# Patient Record
Sex: FEMALE | Race: WHITE
[De-identification: names, ages, dates, MRNs, and addresses within clinical notes are randomized per-mention and may not be internally consistent; named-entity substitution may affect disease eponyms.]

---

## 2017-03-29 ENCOUNTER — HOSPITAL ENCOUNTER (EMERGENCY)
Dept: HOSPITAL 80 - CED | Age: 72
Discharge: HOME | End: 2017-03-29
Payer: COMMERCIAL

## 2017-03-29 VITALS
HEART RATE: 76 BPM | TEMPERATURE: 98.1 F | DIASTOLIC BLOOD PRESSURE: 85 MMHG | OXYGEN SATURATION: 93 % | SYSTOLIC BLOOD PRESSURE: 131 MMHG

## 2017-03-29 VITALS — RESPIRATION RATE: 16 BRPM

## 2017-03-29 DIAGNOSIS — J40: Primary | ICD-10-CM

## 2017-03-29 PROCEDURE — 71020: CPT

## 2017-03-29 NOTE — UCPHY
H & P


Time Seen by Provider: 03/29/17 07:37


Patient Type: Established


HPI/ROS: 





71-year-old female presents complaining of cough for approximately 8 days, some 

mild nasal congestion, no sore throat no earache.


A fever at the beginning of a days however no longer having fevers or chills. 

She does complain that she has had a decreased appetite and is fatigued.





Review of systems


As per HPI


General no fever no chills no weakness, positive fatigue positive poor appetite


HEENT no eye pain no eye discharge. No eye redness, no sore throat


Respiratory positive cough, no shortness of breath


Cardiac no chest pain, no peripheral edema


GI no abdominal pain, no diarrhea, no constipation, no nausea, no vomiting


  no flank pain, no hematuria, no dysuria


Musculoskeletal no myalgias, no joint pain


Heme  no easy bruising, no easy bleeding


Endo no polyuria, no polydipsia


Skin no rashes, no pruritus


Neuro no syncope, no dizziness, no headaches


Psych is no suicidal ideation, no homicidal ideation





Past Medical/Surgical History: 





No history of asthma or emphysema.


Social History: 





Quit smoking 15-16 years ago, denies excessive alcohol or drug use.


Smoking Status: Former smoker


Physical Exam: 


71-year-old female alert and oriented


Alert and oriented nontoxic appearance, no acute distress afebrile


Atraumatic normocephalic


Extraocular muscles intact, anicteric


Nares mild yellowish discharge


Oropharynx mild erythema no tonsillar swelling no exudate no uvular deviation, 

tolerating own secretions


Neck supple no lymphadenopathy


Lungs clear to auscultation bilaterally


Heart regular rate and rhythm


Abdomen normoactive bowel sounds soft nontender


Extremities no cyanosis clubbing or edema


Skin no rash


Constitutional: 


 Initial Vital Signs











Temperature (C)  36.8 C   03/29/17 07:44


 


Heart Rate  83   03/29/17 07:44


 


Respiratory Rate  16   03/29/17 07:44


 


Blood Pressure  133/86 H  03/29/17 07:44


 


O2 Sat (%)  92   03/29/17 07:44








 











O2 Delivery Mode               Room Air














Allergies/Adverse Reactions: 


 





prednisone Allergy (Intermediate, Verified 03/29/17 07:47)


 Hives


acetaminophen Allergy (Unknown, Verified 03/29/17 07:47)


 LIGHT HEADEDNESS


hydrocodone bitartrate [From Lorcet 10/650] Allergy (Unknown, Verified 03/29/17 

07:47)


 LIGHT HEADEDNESS


pentazocine lactate [From Talwin] Allergy (Unknown, Verified 03/29/17 07:47)


 SHAKY


Sulfa (Sulfonamide Antibiotics) Allergy (Unknown, Verified 03/29/17 07:47)


 Rash


sulfamethoxazole [From Gantanol] Allergy (Unknown, Verified 03/29/17 07:47)


 Rash








Home Medications: 














 Medication  Instructions  Recorded


 


Aspirin EC [Aspirin EC 81 mg (OTC)]  06/24/13


 


FENOFIBRATE  06/24/13


 


Fiber [Fiber Diet]  06/24/13


 


Levothyroxine [Synthroid 75 mcg  06/24/13





(RX)]  


 


Omega-3 Fatty Acids [Fish Oil 1000  06/24/13





mg (OTC)]  


 


Labetalol HCl  03/29/17


 


Valsartan  03/29/17














Medical Decision Making


ED Course/Re-evaluation: 





Patient seen and evaluated for cough, cold symptoms of a days duration





Physical exam significant for coarse cough however clear lung fields





Differential diagnosis considered


URI, bronchitis, pharyngitis, pneumonia





Chest x-ray negative








Impression


Bronchitis


Likely viral





Plan


Symptomatic care


Follow up with primary care physician





Departure





- Departure


Disposition: Home, Routine, Self-Care


Clinical Impression: 


 Bronchitis





Condition: Good


Instructions:  Acute Bronchitis (ED)


Referrals: 


TERI,UNKNOWN [Other] - As per Instructions





- PQRS


PQRS Measurement: 





na

## 2017-05-16 NOTE — GHP
[f rep st]



                                                       PREOP HISTORY AND PHYSICAL





DATE OF ADMISSION:  05/31/2017



PROBLEM:  Left hip arthritis.



HISTORY OF PRESENT ILLNESS:  The patient is a 71-year-old woman admitted for a left total hip arthro
plasty.  In the last few months, she has had progressive pain in her left hip.  She is limping.  She
 asked to take a shorter stride.  I did her right total hip arthroplasty in 2013, and she has had an
 excellent result.  She has had 5 operations on her lumbar spine and is currently fused from T8 thro
ugh the sacrum.  She is now having sustained significant left hip pain which is interfering with her
 activities.  She cannot take antiinflammatory medications because she has a damaged kidney.  She is
 admitted for a left total hip arthroplasty.



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
nager.  She was treated for a melanoma in 1988.  She has developed anti-retinal antibodies, which ha
s affected her vision.  She is also treated for hypertension and hypothyroidism.  She has had a spin
al fusion from T8 through the sacrum.  She also has peripheral neuropathy.  There is no history of h
eart disease, stents, DVT, hepatitis, or sleep apnea.  She has chronic lower extremity edema.  The l
eft side is worse than the right.



CURRENT MEDICATIONS:  Amlodipine 10 mg per day.  Atenolol 50 mg per day.  Levothyroxine 75 mcg per d
ay.  Valsartan 80 mg per day.  She also uses hormonal vaginal cream.



DRUG ALLERGIES:  Talwin.  Lorcet causes lightheadedness.  Prednisone causes a rash.



METAL ALLERGIES:  None.



LATEX ALLERGY:  None.



SOCIAL HISTORY:  The patient does not smoke cigarettes and occasionally drinks alcohol.  She is reti
red.  She is  and lives with her .



PHYSICAL EXAMINATION:  GENERAL:  She is a healthy-appearing woman.  Height 5 feet 5 inches.  Weight 
170 pounds.  BMI 28.3.  EYES:  She has had cataract surgery in her left eye with a lens implant.  He
r vision in the left eye is severely reduced.  MOUTH:  Good oral hygiene.  No loose teeth.  CHEST:  
Clear.  HEART:  Regular rhythm.  No murmurs.  EXTREMITIES:  Pertinent findings limited to her left h
ip.  She has full hip extension and 110 degrees of flexion.  External rotation 20 degrees.  Internal
 rotation 0 degrees.  Abduction 30 degrees.



IMAGING:  Her films show advanced degenerative arthritis of the left hip with cartilage space narrow
ing.  Her right total hip looks excellent.  She has hardware in the lower lumbar spine and extending
 into the sacrum and pelvis.



IMPRESSION ON ADMISSION:  

1.  Left hip degenerative arthritis.  She is prepared for a left total hip arthroplasty.

2.  Four years status post successful right total hip arthroplasty.

3.  Spinal fusion from T8 to the sacrum.

4.  Treatment for hypertension and hypothyroidism.

5.  History of chronic lower extremity edema. 





She will undergo a left total hip arthroplasty.  The surgery has been described to her, including th
e risks, complications, expectations, and recovery time.  I have talked to her about the risk of dis
location, leg length inequality, infection, and sciatic nerve injury.  Cup positioning is more probl
ematic because of her lumbosacral fusion.  I have advised her that with bilateral procedures there c
an be mild side-to-side differences in the recovery and in the final result.  All her questions have
 been answered, and she consents to surgery.





Job #:  [id number]

## 2017-05-31 ENCOUNTER — HOSPITAL ENCOUNTER (INPATIENT)
Dept: HOSPITAL 80 - F3N | Age: 72
LOS: 1 days | Discharge: HOME | DRG: 470 | End: 2017-06-01
Attending: ORTHOPAEDIC SURGERY | Admitting: ORTHOPAEDIC SURGERY
Payer: COMMERCIAL

## 2017-05-31 DIAGNOSIS — Z98.1: ICD-10-CM

## 2017-05-31 DIAGNOSIS — I10: ICD-10-CM

## 2017-05-31 DIAGNOSIS — M16.12: Primary | ICD-10-CM

## 2017-05-31 DIAGNOSIS — Z96.641: ICD-10-CM

## 2017-05-31 DIAGNOSIS — E03.9: ICD-10-CM

## 2017-05-31 PROCEDURE — 0SRB04Z REPLACEMENT OF LEFT HIP JOINT WITH CERAMIC ON POLYETHYLENE SYNTHETIC SUBSTITUTE, OPEN APPROACH: ICD-10-PCS | Performed by: ORTHOPAEDIC SURGERY

## 2017-05-31 RX ADMIN — Medication SCH MLS: at 13:35

## 2017-05-31 RX ADMIN — Medication SCH MLS: at 22:20

## 2017-05-31 RX ADMIN — SODIUM CHLORIDE, SODIUM LACTATE, POTASSIUM CHLORIDE, AND CALCIUM CHLORIDE SCH MLS: 600; 310; 30; 20 INJECTION, SOLUTION INTRAVENOUS at 22:18

## 2017-05-31 RX ADMIN — OXYCODONE HYDROCHLORIDE PRN MG: 15 TABLET ORAL at 16:48

## 2017-05-31 RX ADMIN — DOCUSATE SODIUM AND SENNOSIDES SCH TAB: 50; 8.6 TABLET ORAL at 21:06

## 2017-05-31 RX ADMIN — OXYCODONE HYDROCHLORIDE PRN MG: 15 TABLET ORAL at 22:49

## 2017-05-31 RX ADMIN — TRANEXAMIC ACID SCH MG: 650 TABLET ORAL at 13:34

## 2017-05-31 RX ADMIN — ASPIRIN SCH MG: 325 TABLET, FILM COATED ORAL at 22:20

## 2017-05-31 RX ADMIN — DOCUSATE SODIUM AND SENNOSIDES SCH: 50; 8.6 TABLET ORAL at 11:30

## 2017-05-31 RX ADMIN — TRANEXAMIC ACID SCH MG: 650 TABLET ORAL at 22:20

## 2017-05-31 RX ADMIN — SODIUM CHLORIDE, SODIUM LACTATE, POTASSIUM CHLORIDE, AND CALCIUM CHLORIDE SCH MLS: 600; 310; 30; 20 INJECTION, SOLUTION INTRAVENOUS at 11:29

## 2017-05-31 RX ADMIN — FAMOTIDINE SCH MG: 20 TABLET, FILM COATED ORAL at 21:05

## 2017-05-31 NOTE — GOP
[f rep st]



                                                                OPERATIVE REPORT





DATE OF OPERATION:  05/31/2017



SURGEON:  Wagner Pacheco MD



ASSISTANT:  Alejandro Banda and Xander Murray.



ANESTHESIA:  General.



ANESTHESIOLOGIST:  Dr. Roxanne Aceves.



PREOPERATIVE DIAGNOSIS:  Left hip arthritis.



POSTOPERATIVE DIAGNOSIS:  Left hip arthritis



PROCEDURE PERFORMED:  A left total hip arthroplasty, ceramic femoral head on highly cross-linked nicole
yethylene cup liner.



FINDINGS:  



DESCRIPTION OF PROCEDURE:  The patient was given 2 g of preoperative IV Ancef within 60 minutes of s
urgery.  She also received IV tranexamic acid at a dose of 20 mg/kg.  She was placed on the [facility]
g room table and given general anesthesia by Dr. Aceves.  She had a previous extensive lumbar spine 
fusion and was not a candidate for spinal anesthesia.  A Page catheter was not used.  She wore a TE
D stocking and SCD on the nonoperative leg.  She was rolled to the right lateral decubitus position.
  The position was secured with the pegboard table attachment.  An axillary roll was used, and all p
ressure points were carefully padded.  I was careful to lock her pelvis in a vertical position.  Her
 perineum was isolated with plastic adhesive drapes.  Her left hip and left lower extremity were pre
pped with ChloraPrep.  They were draped free using sterile sheets, stockinette, and Ioban plastic dr reveles. 



The [employer] time-out was performed to verify the correct surgical side and the cor
rect patient identity.  The [place] time-out was also performed. 



I made a 5-6 inch straight oblique posterolateral hip skin incision.  The subcutaneous tissues were 
sharply divided, and hemostasis was obtained using electrocautery.  Her fascia deepti was split along 
the axis of its fibers.  I then curved posteriorly and proximally, and split the fascia of gluteus m
aximus and bluntly split the muscle fibers in line with their orientation.  The Charnley self-retain
ing retractor was inserted.  Her sciatic nerve was located and protected throughout the procedure.  
The external rotators and the posterior hip capsule were divided as separate layers at the base of t
he femoral neck, tagged, and reflected posteriorly.  A smooth 8-inch Steinmann pin was inserted vert
ically into the ilium, superior to the acetabulum.  An 8-inch drill bit was inserted vertically into
 the greater trochanter and parallel to the first pin.  The distance between the two was measured fo
r leg length reference.  Her femoral head was dislocated.  Her femoral neck was osteotomized at the 
appropriate level and inclination. 



I was careful to preserve all the anterior and posterior capsule.  The remnant of her damaged labrum
 was excised. 



The femur was prepared first.  This allowed me to  the amount of natural femoral neck anteversi
on.  This, in turn, allowed me to later determine the correct amount of cup anteversion.  She had ap
proximately 20 degrees of natural femoral neck anteversion.  Her canal was opened laterally with a b
ox chisel.  I reamed and broached sequentially up to a size 9.  The size 9 broach was used as a tria
l stem.  I was careful to lateralize adequately. 



Appropriate retractors were inserted to expose the acetabulum.  The acetabulum was reamed sequential
ly up to 53 mm.  I selected a 54 mm Salvador Tritanium solid-backed hemispherical shell.  This was ta
pped securely into place in the proper degree of inclination and anteversion.  I used the transverse
 acetabular ligament and other acetabular bony landmarks to help me properly orient the cup.  Fixati
on was tight, and I did not think supplemental screws were necessary.  I inserted a screw-in metal d
ome hole plug. 



I performed a series of trial reductions to determine length and stability.  I concluded that the si
ze 9 stem with a +2.5 mm neck length, a 32 mm head and a 10 degree lipped liner gave me the proper c
ombination of appropriate length and good anterior and posterior stability.  She was a few millimete
rs short on this side preoperatively and I was intentionally lengthening her a small amount. 



The 10-degree lip Salvador X3 highly cross-linked polyethylene liner was inserted and tapped securely
 into place.  I selected the Widen Secur-Fit Max stem in a size 9 with standard offset.  This was 
inserted press-fit and was very tight.  I did one final trial reduction and confirmed that the +2.5 
mm neck length with a 32 mm head was the proper combination.  I chose the Salvador Biolox Delta ceram
ic head with an outside diameter of 32 mm and a neck length of +2.5 mm.  This was tapped securely on
to the clean trunnion.  Her acetabulum was irrigated and cleaned, and the hip was reduced 1 final ti
me.  She had excellent anterior and posterior stability and appropriate length. 



Then 40 mL of the joint anesthetic cocktail were injected into the capsule, the deep musculature, an
d the subcutaneous tissues around the skin edges.  The joint was thoroughly irrigated one final time
 with dilute Betadine solution.  Her sciatic nerve was reinspected and looked unharmed.  The externa
l rotators and the posterior hip capsule were repaired in separate layers with #2 FiberWire sutures 
through drill holes in the greater trochanter.  This provided a very strong posterior capsular and e
xternal rotator repair.  Her fascia deepti was closed first with a couple of interrupted nopfct-ae-wul
ht #2 FiberWire sutures followed by a running #2 barbed Ethicon STRATAFIX PDO suture.  The subcutane
ous tissues were closed with a running 0 barbed Ethicon STRATAFIX Monoderm suture.  The skin was seth
sed with a running 3-0 barbed Ethicon STRATAFIX Monoderm subcuticular suture.  The skin edges were r
eapproximated and sealed with Dermabond glue.  The wound was covered with a strip of Telfa, and ever
ything was held in place with a piece of clear plastic Tegaderm. 



A long-leg ANGELES stocking and SCD were applied to her left lower extremity.  She wore a stocking and S
CD on the opposite leg during the procedure.  An abduction pillow was placed between her knees.  She
 was awakened from anesthesia and rolled to the supine position on her [facility].  She was shelia
en to PACU in satisfactory condition.  There were no recognized intraoperative complications.  The e
stimated blood loss was about 400 mL.  The sponge and needle counts were correct on 2 occasions. 



I used a Salvador Tritanium hemispherical press-fit solid-backed acetabular shell with an outside arlene
meter of 54 mm.  The liner was a Salvador X3 10-degree lipped highly cross-linked liner with an insid
e diameter of 32 mm.  

Femoral component was a standard offset Salvador Secur-Fit Max stem and a size 9 and press-fit.  The 
femoral head was a Salvador Biolox Delta ceramic head with a 2.5 mm neck length and a 32 mm outside d
iameter. 



Alejandro Banda and Xander Murray acted as surgical assistants.  Their assistance was a medical karrie dumont.





Job #:  [id number]

## 2017-05-31 NOTE — POSTOPPROG
Post Op Note


Date of Operation: 05/31/17


Surgeon: Wagner Pacheco


Assistant: Solo/Trevor


Anesthesiologist: Ketan


Anesthesia: GET(General Endotracheal)


Post-op Diagnosis: left hip arthritis


Procedure: L SIMRAN


Inf/Abcess present in the surg proc area at time of surgery?: No


EBL: 100-500

## 2017-06-01 VITALS — RESPIRATION RATE: 18 BRPM

## 2017-06-01 VITALS
DIASTOLIC BLOOD PRESSURE: 69 MMHG | SYSTOLIC BLOOD PRESSURE: 133 MMHG | HEART RATE: 84 BPM | OXYGEN SATURATION: 95 % | TEMPERATURE: 98 F

## 2017-06-01 LAB
HCT VFR BLD CALC: 39 % (ref 38–47)
HGB BLD-MCNC: 12.9 G/DL (ref 12.6–16.3)

## 2017-06-01 RX ADMIN — OXYCODONE HYDROCHLORIDE PRN MG: 15 TABLET ORAL at 10:50

## 2017-06-01 RX ADMIN — TRANEXAMIC ACID SCH MG: 650 TABLET ORAL at 05:27

## 2017-06-01 RX ADMIN — FAMOTIDINE SCH MG: 20 TABLET, FILM COATED ORAL at 08:08

## 2017-06-01 RX ADMIN — DOCUSATE SODIUM AND SENNOSIDES SCH TAB: 50; 8.6 TABLET ORAL at 08:08

## 2017-06-01 RX ADMIN — OXYCODONE HYDROCHLORIDE PRN MG: 15 TABLET ORAL at 04:12

## 2017-06-01 RX ADMIN — ASPIRIN SCH MG: 325 TABLET, FILM COATED ORAL at 08:08

## 2017-06-01 NOTE — SOAPPROG
SOAP Progress Note


Assessment/Plan: 


Assessment:


Afebrile.  Awake and alert.


Has been walking in room.


Sciatic nerve intact.


H/H is good.


Films look good.























Plan:Up with PT.


ZACKERY later today.





06/01/17 07:24





Objective: 





 Vital Signs











Temp Pulse Resp BP Pulse Ox


 


 36.7 C   90   18   132/72 H  93 


 


 06/01/17 04:00  06/01/17 04:00  06/01/17 04:00  06/01/17 04:00  06/01/17 04:00








 Laboratory Results





 06/01/17 04:15 





 











 05/31/17 06/01/17 06/02/17





 05:59 05:59 05:59


 


Intake Total  1400 


 


Output Total  1350 


 


Balance  50 














ICD10 Worksheet


Patient Problems: 


 Problems











Problem Status Onset


 


Osteoarthritis of left hip Acute  


 


Osteoarthritis of hip Active

## 2017-06-01 NOTE — GDS
[f rep st]



                                                             DISCHARGE SUMMARY





PREOPERATIVE DIAGNOSIS:  Left hip arthritis.



DISCHARGE DIAGNOSIS:  Left hip arthritis.



OPERATION PERFORMED:  05/31/2017, a left total hip arthroplasty, ceramic femoral head on highly cros
s-linked polyethylene cup liner.



POSTOPERATIVE COMPLICATIONS:  None.



CONDITION ON DISCHARGE:  Improved.



DESCRIPTION OF HOSPITAL COURSE:  The patient was admitted to the hospital on the morning of surgery.
  Her admission CBC was normal.  The same day, under general anesthesia, she underwent a left total 
hip arthroplasty.  Postoperatively, she was treated with multimodal DVT prophylaxis, including aspir
in and early mobilization.  On the first postoperative day, her hemoglobin and hematocrit were 12.9 
and 39.0.  She was seen by Physical Therapy and made excellent progress with ambulation and stairs. 
 By the time of discharge, she was afebrile and was independent walking.



DISPOSITION:  The patient is discharged to her home.  I will see her back in the office on June 19, 2017.  Continue ANGELES stockings for 1 week.  Continue aspirin 325 mg p.o. daily for 21 days.  Use an a
bduction pillow in bed for 3 weeks.  She may progress to full weightbearing on the left as tolerated
.  If there are any problems, she is to call me at the office.





Job #:  [id number]

## 2019-06-16 ENCOUNTER — HOSPITAL ENCOUNTER (EMERGENCY)
Dept: HOSPITAL 80 - FED | Age: 74
Discharge: HOME | End: 2019-06-16
Payer: COMMERCIAL